# Patient Record
Sex: FEMALE | Race: WHITE | NOT HISPANIC OR LATINO | Employment: OTHER | URBAN - METROPOLITAN AREA
[De-identification: names, ages, dates, MRNs, and addresses within clinical notes are randomized per-mention and may not be internally consistent; named-entity substitution may affect disease eponyms.]

---

## 2023-04-10 ENCOUNTER — HOSPITAL ENCOUNTER (OUTPATIENT)
Dept: DATA CONVERSION | Facility: HOSPITAL | Age: 73
End: 2023-04-10
Attending: ANESTHESIOLOGY
Payer: MEDICARE

## 2023-04-10 DIAGNOSIS — I10 ESSENTIAL (PRIMARY) HYPERTENSION: ICD-10-CM

## 2023-04-10 DIAGNOSIS — M54.16 RADICULOPATHY, LUMBAR REGION: ICD-10-CM

## 2023-04-10 DIAGNOSIS — Z91.041 RADIOGRAPHIC DYE ALLERGY STATUS: ICD-10-CM

## 2023-05-12 ENCOUNTER — HOSPITAL ENCOUNTER (OUTPATIENT)
Dept: DATA CONVERSION | Facility: HOSPITAL | Age: 73
End: 2023-05-12
Attending: ANESTHESIOLOGY
Payer: MEDICARE

## 2023-05-12 DIAGNOSIS — M54.16 RADICULOPATHY, LUMBAR REGION: ICD-10-CM

## 2023-05-12 DIAGNOSIS — E78.5 HYPERLIPIDEMIA, UNSPECIFIED: ICD-10-CM

## 2023-05-12 DIAGNOSIS — Z91.041 RADIOGRAPHIC DYE ALLERGY STATUS: ICD-10-CM

## 2023-05-12 DIAGNOSIS — M48.061 SPINAL STENOSIS, LUMBAR REGION WITHOUT NEUROGENIC CLAUDICATION: ICD-10-CM

## 2023-05-12 DIAGNOSIS — I10 ESSENTIAL (PRIMARY) HYPERTENSION: ICD-10-CM

## 2023-09-27 DIAGNOSIS — I10 HYPERTENSION, UNSPECIFIED TYPE: ICD-10-CM

## 2023-09-27 RX ORDER — TRIAMTERENE/HYDROCHLOROTHIAZID 37.5-25 MG
1 TABLET ORAL DAILY
Qty: 90 TABLET | Refills: 0 | Status: SHIPPED | OUTPATIENT
Start: 2023-09-27 | End: 2024-01-02 | Stop reason: SDUPTHER

## 2023-10-02 NOTE — OP NOTE
Post Operative Note:     Post-Procedure Diagnosis: Right-sided lumbar radiculitis   Procedure: 1.   2.   3.   4.   5.   Surgeon: Jamie   Resident/Fellow/Other Assistant: Fellow   Estimated Blood Loss (mL): none   Specimen: no   Findings: None     Operative Report Dictated:  Dictation: not applicable - note contains Operative  Report   Operative Report:    Preoperative diagnosis:  Lumbar radiculitis  Postoperative diagnosis:  Lumbar radiculitis  Procedure: Right L4 and 5 Lumbar transforaminal epidural steroid injection under fluoroscopic guidance  Surgeon: Gemma Ayala  Assistant:  Fellow  Anesthesia: Local, IV sedation  Complications: Apparently none    Clinical note: Josep is a 72-year-old female with history of low back and right leg pain who presents today for the aforementioned procedure.    Procedure note: The patient was met in the preoperative holding area after risks benefits and alternatives to procedure were discussed with the patient, informed consent was obtained. Patient brought back to the procedure room and placed in the prone  position on the fluoroscopy table. Area over the back was exposed, prepped, draped, in the usual sterile fashion.  Skin and subcutaneous tissues to the neuroforamen was anesthetized using 0.5% lidocaine.  22-gauge Sprotte needles were inserted in the  skin and advanced into the foramen. Needle tip position was confirmed in AP oblique and lateral view.  Contrast was injected which showed appropriate epidural spread, no intravascular or intrathecal uptake. A total of 2 mL of 0.5% lidocaine mixed with  10 mg dexamethasone was injected in divided doses among the 2 needles. Needles removed, bandage applied, patient tolerated the procedure well with no immediate complications.      Attestation:   Note Completion:  Attending Attestation I was present for the entire procedure         Electronic Signatures:  Gemma Ayala)  (Signed 10-Apr-2023 15:00)   Authored: Post  Operative Note, Note Completion      Last Updated: 10-Apr-2023 15:00 by Gemma Ayala)

## 2023-10-02 NOTE — OP NOTE
Post Operative Note:     Post-Procedure Diagnosis: Right-sided lumbar radiculitis   Procedure: 1.   2.   3.   4.   5.   Surgeon: Jamie   Resident/Fellow/Other Assistant: Brendan   Estimated Blood Loss (mL): none   Specimen: no   Findings: None     Operative Report Dictated:  Dictation: not applicable - note contains Operative  Report   Operative Report:    Preoperative diagnosis:  Lumbar radiculitis  Postoperative diagnosis:  Lumbar radiculitis, stenosis  Procedure: Right L4 and 5 lumbar transforaminal epidural steroid injection under fluoroscopic guidance  with methylprednisolone  Surgeon: Gemma Ayala  Assistant:  Fellow  Anesthesia: Local  Complications: Apparently none    Clinical note: Josep is a 72-year-old female with history of low back and right leg pain who presents today for the aforementioned procedure.    Procedure note: The patient was met in the preoperative holding area after risks benefits and alternatives to procedure were discussed with the patient, informed consent was obtained. Patient brought back to the procedure room and placed in the prone  position on the fluoroscopy table. Area over the back was exposed, prepped, draped, in the usual sterile fashion.  Skin and subcutaneous tissues to the neuroforamen was anesthetized using 0.5% lidocaine.  22-gauge Sprotte needles were inserted in the  skin and advanced into the foramen. Needle tip position was confirmed in AP oblique and lateral view.  Contrast was injected which showed appropriate epidural spread, no intravascular or intrathecal uptake. A total of 2 mL of  2% lidocaine was injected in divided dose among 2 needles.  Lower extremity motor strength assessed at 1 and 2 minutes which was grossly  intact.  Thereafter 1 mL of half percent lidocaine mixed with 40 mg of methylprednisolone was injected in divided doses among the 2 needles. Needles removed, bandage applied, patient tolerated  the procedure well with no immediate  complications.      Attestation:   Note Completion:  Attending Attestation I was present for the entire procedure         Electronic Signatures:  Gemma Ayala)  (Signed 12-May-2023 13:15)   Authored: Post Operative Note, Note Completion      Last Updated: 12-May-2023 13:15 by Gemma Ayala)

## 2023-10-18 DIAGNOSIS — I10 ESSENTIAL (PRIMARY) HYPERTENSION: ICD-10-CM

## 2023-10-18 RX ORDER — ATORVASTATIN CALCIUM 10 MG/1
10 TABLET, FILM COATED ORAL DAILY
Qty: 90 TABLET | Refills: 2 | Status: SHIPPED | OUTPATIENT
Start: 2023-10-18 | End: 2024-01-02 | Stop reason: SDUPTHER

## 2023-11-15 RX ORDER — ALPRAZOLAM 0.5 MG/1
TABLET ORAL
COMMUNITY
End: 2024-01-02 | Stop reason: SDUPTHER

## 2023-11-15 RX ORDER — ALPRAZOLAM 0.5 MG/1
TABLET ORAL
Qty: 7 TABLET | OUTPATIENT
Start: 2023-11-15

## 2023-11-19 ENCOUNTER — PATIENT MESSAGE (OUTPATIENT)
Dept: ORTHOPEDIC SURGERY | Facility: CLINIC | Age: 73
End: 2023-11-19
Payer: MEDICARE

## 2023-11-21 NOTE — TELEPHONE ENCOUNTER
From: Josep Chun  To: Nancy Garrett MD  Sent: 11/19/2023 12:08 PM EST  Subject: Trigger finger    Hi dr Garrett - I last met with you on May 5 2023. At that visit I was given an injection to help with my trigger finger. It has retuned - can I make an appointment for another injection Thank you Josep Chun

## 2023-11-29 ENCOUNTER — OFFICE VISIT (OUTPATIENT)
Dept: ORTHOPEDIC SURGERY | Facility: HOSPITAL | Age: 73
End: 2023-11-29
Payer: MEDICARE

## 2023-11-29 DIAGNOSIS — M79.641 PAIN OF RIGHT HAND: ICD-10-CM

## 2023-11-29 DIAGNOSIS — M65.341 TRIGGER RING FINGER OF RIGHT HAND: Primary | ICD-10-CM

## 2023-11-29 PROCEDURE — 20551 NJX 1 TENDON ORIGIN/INSJ: CPT | Performed by: STUDENT IN AN ORGANIZED HEALTH CARE EDUCATION/TRAINING PROGRAM

## 2023-11-29 PROCEDURE — 1126F AMNT PAIN NOTED NONE PRSNT: CPT | Performed by: STUDENT IN AN ORGANIZED HEALTH CARE EDUCATION/TRAINING PROGRAM

## 2023-11-29 PROCEDURE — 76942 ECHO GUIDE FOR BIOPSY: CPT | Performed by: STUDENT IN AN ORGANIZED HEALTH CARE EDUCATION/TRAINING PROGRAM

## 2023-11-29 PROCEDURE — 20551 NJX 1 TENDON ORIGIN/INSJ: CPT | Mod: RT | Performed by: STUDENT IN AN ORGANIZED HEALTH CARE EDUCATION/TRAINING PROGRAM

## 2023-11-29 PROCEDURE — 99213 OFFICE O/P EST LOW 20 MIN: CPT | Performed by: STUDENT IN AN ORGANIZED HEALTH CARE EDUCATION/TRAINING PROGRAM

## 2023-11-29 PROCEDURE — 1159F MED LIST DOCD IN RCRD: CPT | Performed by: STUDENT IN AN ORGANIZED HEALTH CARE EDUCATION/TRAINING PROGRAM

## 2023-11-29 PROCEDURE — 99213 OFFICE O/P EST LOW 20 MIN: CPT | Mod: GC | Performed by: STUDENT IN AN ORGANIZED HEALTH CARE EDUCATION/TRAINING PROGRAM

## 2023-11-29 PROCEDURE — 1036F TOBACCO NON-USER: CPT | Performed by: STUDENT IN AN ORGANIZED HEALTH CARE EDUCATION/TRAINING PROGRAM

## 2023-11-29 RX ORDER — METHYLPREDNISOLONE ACETATE 40 MG/ML
40 INJECTION, SUSPENSION INTRA-ARTICULAR; INTRALESIONAL; INTRAMUSCULAR; SOFT TISSUE ONCE
Status: SHIPPED | OUTPATIENT
Start: 2023-11-29

## 2023-11-29 ASSESSMENT — PAIN DESCRIPTION - DESCRIPTORS: DESCRIPTORS: SHARP

## 2023-11-29 ASSESSMENT — PAIN SCALES - GENERAL: PAINLEVEL_OUTOF10: 8

## 2023-11-29 ASSESSMENT — PAIN - FUNCTIONAL ASSESSMENT: PAIN_FUNCTIONAL_ASSESSMENT: 0-10

## 2023-11-29 NOTE — PROGRESS NOTES
REFERRAL SOURCE: No ref. provider found     CHIEF COMPLAINT: right hand pain    HISTORY OF PRESENT ILLNESS  Josep Chun is a very pleasant 73 y.o. right-hand dominant female with history of HLD, osteoporosis, migraines, chronic neck and low back pain who is here for evaluation of right ring trigger finger.     Previous history:   5/5/2023: Right hand symptoms started gradually a few months ago. She is having increasing difficulty with ADLs such as cooking given difficulty extending the 4th digit. She has not had therapies or an injection to this hand in the past. Left hand symptoms started after her cervical epidural injection. She had an IV in the dorsal radial wrist in the area of the snuffbox during the epidural. Postoperatively she has noted a burning sensation and paresthesias in the dorsal radial hand radiating to the dorsal 1-3 digits. She denies weakness. She does have a history of chronic neck pain with a right radiculopathy and denies neck pain radiating down the left upper extremity.      8/9/23: Complaining now of continued area of numbness/tingling in the dorsal left hand radiating into the dorsal first through third, but mostly the second digit. States that this area has not changed significantly since last visit. Palpation over the dorsal radial forearm, approximately 4 cm proximal to the wrist joint, reproduces symptoms. This is reportedly the area where she had her IV placed previously. She also complains of left middle finger PIP joint achiness and pain that has been bothering her for the past 2 months. No injury or trauma. Right ring finger trigger finger doing well after injection last visit.     She underwent right superficial radial nerve hydrodissection on 8/9/2023.    Interval history:  11/29/23: Presents for complaint of return of right ring finger trigger finger.  She was previously seen for this on 5/5/2023 and received a corticosteroid injection at that time.  States that symptoms  started returning 3 to 4 weeks ago and have been getting progressively worse.  They are not quite back to the point of where they were prior to initial visit for the same complaint, but states that when she wakes up in the morning, she often has to force her finger open.  She would like to try 1 more injection and if it comes back plans on going to see Dr. Patel.  Denies interval injury to the hand.    She did note improvement of the right superficial radial nerve hydrodissection on 2023, though she does have low-level pain that is not bothersome and only happens if she lightly rubs over that area.    MEDS    Current Outpatient Medications:     ALPRAZolam (Xanax) 0.5 mg tablet, Take by mouth., Disp: , Rfl:     atorvastatin (Lipitor) 10 mg tablet, TAKE 1 TABLET BY MOUTH EVERY DAY, Disp: 90 tablet, Rfl: 2    triamterene-hydrochlorothiazid (Maxzide-25) 37.5-25 mg tablet, TAKE 1 TABLET BY MOUTH EVERY DAY, Disp: 90 tablet, Rfl: 0    ALLERGIES  Not on File    PAST MEDICAL HISTORY  Past Medical History:   Diagnosis Date    Meniere's disease, unspecified ear     Meniere disease    Pain in unspecified hip 2013    Joint pain, hip    Personal history of other diseases of the musculoskeletal system and connective tissue 2013    History of bursitis    Personal history of other endocrine, nutritional and metabolic disease     History of thyroid disease    Personal history of other infectious and parasitic diseases     History of varicella       PAST SURGICAL HISTORY  Past Surgical History:   Procedure Laterality Date     SECTION, CLASSIC  2014     Section    COLONOSCOPY  2014    Complete Colonoscopy    OTHER SURGICAL HISTORY  2020    Shoulder arthroscopy    TUBAL LIGATION  2014    Tubal Ligation       SOCIAL HISTORY   Social History     Socioeconomic History    Marital status:      Spouse name: Not on file    Number of children: Not on file    Years of education:  Not on file    Highest education level: Not on file   Occupational History    Not on file   Tobacco Use    Smoking status: Not on file    Smokeless tobacco: Not on file   Substance and Sexual Activity    Alcohol use: Not on file    Drug use: Not on file    Sexual activity: Not on file   Other Topics Concern    Not on file   Social History Narrative    Not on file     Social Determinants of Health     Financial Resource Strain: Not on file   Food Insecurity: Not on file   Transportation Needs: Not on file   Physical Activity: Not on file   Stress: Not on file   Social Connections: Not on file   Intimate Partner Violence: Not on file   Housing Stability: Not on file       FAMILY HISTORY  No family history on file.    REVIEW OF SYSTEMS  Except for those mentioned in the history of present illness, and below, a complete review of systems is negative.     Review of Systems    VITALS  There were no vitals filed for this visit.    PHYSICAL EXAMINATION   GENERAL: Awake, alert, and oriented, no apparent distress, pleasant, and cooperative  PSYC: Mood is euthymic, affect is congruent  EAR, NOSE, THROAT: Normocephalic, atraumatic, moist membranes, anicteric sclera  LUNG: Nonlabored breathing  HEART: No clubbing or cyanosis  SKIN: No increased erythema, warmth, rashes, or concerning skin lesions  NEURO: Sensation is intact in the bilateral upper and lower extremities. Strength is grossly 5 out of 5 throughout the bilateral upper and lower extremities including wrist extension and EIP, unless noted below. Sensation is intact to light touch in bilateral upper extremities.  MUSCULOSKELETAL:  Examination of the right wrist and hand: Wrist range of motion is full and pain free except positive trigger finger of digit 4. No swelling or ecchymosis. No thenar or hypothenar atrophy. No tenderness with palpation of the wrist joint, TFCC region, 1st dorsal compartment, 1st CMC joint or STT joint. No tenderness with palpation of the  scaphoid.     IMAGING STUDIES:  Radiographs of bilateral hands dated 5/5/2023 - scattered PIP and DIP arthritis as well as first CMC arthritic change, all of which is mild.      IMPRESSION  #1  Right ring finger trigger finger, recurrent    PLAN  The following was discussed with the patient:    Josep Chun is a very pleasant 73 y.o. right-hand dominant female with history of HLD, osteoporosis, migraines, chronic neck and low back pain who is here for evaluation of right ring finger trigger finger.  Her history and physical exam seem most consistent with recurrence of right ring finger trigger finger. We discussed the utility of a repeat right ring finger trigger finger injection under ultrasound guidance and she wished to proceed.  Please see procedure note below for complete details.  We did discuss that if this continues to recur that she should seek surgical consultation and she plans to follow-up with Dr. Patel if that does happen.  Follow-up with me as needed.    The patient was counseled to remain active, but avoid activities that worsen symptoms. The patient was in agreement with this plan. All questions were answered to the best of my ability.    PATIENT EDUCATION:  Education was discussed at today's appointment. A learning needs assessment was performed.    Primary learner: Josep Chun  Barriers to learning: None  Preferred language: English  Learning preferences include: Seeing and doing.  Discussed: Diagnosis and treatment plan.  Demonstrated: Understanding of material discussed.  Patient education materials given: None.  Learner response: Learner demonstrated understanding.    This note was dictated using Dragon speech recognition software and was not corrected for spelling or grammatical errors.    ______________________________________________________________________________________________________    Pre-Procedure Diagnosis: right ring finger pain and trigger finger   Post-Procedure  Diagnosis: right ring finger pain and trigger finger     Procedure: US-guided right ring finger flexor tendon sheath corticosteroid injection     History of Present Illness: VALENTINO SAAVEDRA is a pleasant 73 female with finger pain/catching due to trigger finger who is here for the above procedure for diagnostic purposes and improved pain control.      Medications and allergies were reviewed with the patient. No contraindications were identified.     Informed Consent  Following denial of allergy and review of potential side effects and complications including but not necessarily limited to infection, allergic reaction, local tissue breakdown, injury to soft tissue and/or nerves and seizure, the patient indicated understanding and agreed to proceed. Written consent to treatment was obtained and the patient verbalized consent for the procedure.     Procedural Details  The use of direct ultrasound visualization of the needle (rather than a non-guided injection) was required to increase patient safety by excluding inadvertent intramuscular or intratendinous placement and minimizing bleeding by avoiding osteochondral or vascular injury from the needle. Additionally, the increased accuracy of placement may increase clinical effectiveness and will allow higher diagnostic specificity when evaluating effectiveness of this injection.     Using ultrasound, a pre-scan of the region was performed to identify the target structure.      The area was prepped with chlorhexidine, then re-examined using the same transducer, a sterile ultrasound transducer cover, and sterile ultrasound gel.     Procedural pause conducted to verify: Correct patient identity, procedure to be performed, and as applicable, correct side and site, correct patient position, and availability of implants, special equipment, or special requirements.     Procedure:   Transducer: Linear array transducer.  Patient position: Supine with the forearm fully supinated,  wrist in a neutral position, and the fingers fully extended  Localization process: With the transducer in an anatomic sagittal plane, the flexor tendon was localized in a long axis view.  Local anesthesia: No local anesthesia was used.  Needle: A 30-gauge, 1-inch needle was used for the injection.  Approach: A distal to proximal, in-plane, approach was used to guide the needle tip into the superficial flexor tendon sheath in the region of the A1 pulley.  Injection/Aspiration: A mixture of 0.25 cc of 1% lidocaine and 1 cc of DepoMedrol (40mg/mL) was injected into the right flexor tendon sheath in the region of the A1 pulley without complication.      Post-procedural care: The patient tolerated the procedure well and reported complete pain relief during the anesthetic phase. The patient was asked to ice for improved pain control and avoid submerging the area in water for the next 48 hours to help reduce the risk of infection. The patient was instructed to call the office immediately if there are any questions or concerns.      PATIENT EDUCATION:  Education of the diagnosis and treatment plan was discussed at today's appointment. A learning needs assessment was performed. The patient demonstrated understanding.     _________________________________________________________________________________________    Patient seen and examined with sports medicine fellow, Dr. Rhodes. History, physical examination, pertinent imaging findings and the plan of care were discussed and I performed the key portions of the history, physical examination, and discussion of the plan of care. I have edited his note and agree with the findings. Dr. Rhodes performed the procedure under my direct supervision. I was present for the entire procedure.     Nancy Garrett MD    [facility]

## 2024-01-02 ENCOUNTER — OFFICE VISIT (OUTPATIENT)
Dept: PRIMARY CARE | Facility: CLINIC | Age: 74
End: 2024-01-02
Payer: MEDICARE

## 2024-01-02 VITALS
OXYGEN SATURATION: 98 % | DIASTOLIC BLOOD PRESSURE: 60 MMHG | HEIGHT: 65 IN | HEART RATE: 85 BPM | BODY MASS INDEX: 33.46 KG/M2 | SYSTOLIC BLOOD PRESSURE: 123 MMHG

## 2024-01-02 DIAGNOSIS — E78.5 HYPERLIPIDEMIA, UNSPECIFIED HYPERLIPIDEMIA TYPE: ICD-10-CM

## 2024-01-02 DIAGNOSIS — G89.29 CHRONIC NONINTRACTABLE HEADACHE, UNSPECIFIED HEADACHE TYPE: ICD-10-CM

## 2024-01-02 DIAGNOSIS — R51.9 CHRONIC NONINTRACTABLE HEADACHE, UNSPECIFIED HEADACHE TYPE: ICD-10-CM

## 2024-01-02 DIAGNOSIS — I10 HYPERTENSION, UNSPECIFIED TYPE: ICD-10-CM

## 2024-01-02 DIAGNOSIS — F41.9 ANXIETY: ICD-10-CM

## 2024-01-02 DIAGNOSIS — Z00.00 MEDICARE ANNUAL WELLNESS VISIT, SUBSEQUENT: Primary | ICD-10-CM

## 2024-01-02 PROBLEM — E78.01 FAMILIAL HYPERCHOLESTEROLEMIA: Status: ACTIVE | Noted: 2024-01-02

## 2024-01-02 PROBLEM — G47.00 INSOMNIA: Status: RESOLVED | Noted: 2024-01-02 | Resolved: 2024-01-02

## 2024-01-02 PROBLEM — M16.9 HIP OSTEOARTHRITIS: Status: RESOLVED | Noted: 2024-01-02 | Resolved: 2024-01-02

## 2024-01-02 PROBLEM — M19.042 PRIMARY OSTEOARTHRITIS OF LEFT HAND: Status: RESOLVED | Noted: 2024-01-02 | Resolved: 2024-01-02

## 2024-01-02 PROBLEM — F32.A DEPRESSION: Status: RESOLVED | Noted: 2024-01-02 | Resolved: 2024-01-02

## 2024-01-02 PROBLEM — E03.9 HYPOTHYROIDISM: Status: RESOLVED | Noted: 2024-01-02 | Resolved: 2024-01-02

## 2024-01-02 PROBLEM — R92.30 BREAST DENSITY: Status: RESOLVED | Noted: 2024-01-02 | Resolved: 2024-01-02

## 2024-01-02 PROCEDURE — 3078F DIAST BP <80 MM HG: CPT | Performed by: STUDENT IN AN ORGANIZED HEALTH CARE EDUCATION/TRAINING PROGRAM

## 2024-01-02 PROCEDURE — 1170F FXNL STATUS ASSESSED: CPT | Performed by: STUDENT IN AN ORGANIZED HEALTH CARE EDUCATION/TRAINING PROGRAM

## 2024-01-02 PROCEDURE — G0439 PPPS, SUBSEQ VISIT: HCPCS | Performed by: STUDENT IN AN ORGANIZED HEALTH CARE EDUCATION/TRAINING PROGRAM

## 2024-01-02 PROCEDURE — 3074F SYST BP LT 130 MM HG: CPT | Performed by: STUDENT IN AN ORGANIZED HEALTH CARE EDUCATION/TRAINING PROGRAM

## 2024-01-02 PROCEDURE — 99214 OFFICE O/P EST MOD 30 MIN: CPT | Performed by: STUDENT IN AN ORGANIZED HEALTH CARE EDUCATION/TRAINING PROGRAM

## 2024-01-02 PROCEDURE — 1160F RVW MEDS BY RX/DR IN RCRD: CPT | Performed by: STUDENT IN AN ORGANIZED HEALTH CARE EDUCATION/TRAINING PROGRAM

## 2024-01-02 PROCEDURE — 1036F TOBACCO NON-USER: CPT | Performed by: STUDENT IN AN ORGANIZED HEALTH CARE EDUCATION/TRAINING PROGRAM

## 2024-01-02 PROCEDURE — 1126F AMNT PAIN NOTED NONE PRSNT: CPT | Performed by: STUDENT IN AN ORGANIZED HEALTH CARE EDUCATION/TRAINING PROGRAM

## 2024-01-02 PROCEDURE — 1159F MED LIST DOCD IN RCRD: CPT | Performed by: STUDENT IN AN ORGANIZED HEALTH CARE EDUCATION/TRAINING PROGRAM

## 2024-01-02 RX ORDER — ALPRAZOLAM 0.5 MG/1
0.5 TABLET ORAL NIGHTLY PRN
Qty: 10 TABLET | Refills: 0 | Status: SHIPPED | OUTPATIENT
Start: 2024-01-02 | End: 2024-04-16 | Stop reason: WASHOUT

## 2024-01-02 RX ORDER — AMITRIPTYLINE HYDROCHLORIDE 10 MG/1
10 TABLET, FILM COATED ORAL NIGHTLY
COMMUNITY
End: 2024-01-02 | Stop reason: SDUPTHER

## 2024-01-02 RX ORDER — TRIAMTERENE/HYDROCHLOROTHIAZID 37.5-25 MG
1 TABLET ORAL DAILY
Qty: 90 TABLET | Refills: 0 | Status: SHIPPED | OUTPATIENT
Start: 2024-01-02 | End: 2024-05-28

## 2024-01-02 RX ORDER — ATORVASTATIN CALCIUM 10 MG/1
10 TABLET, FILM COATED ORAL DAILY
Qty: 90 TABLET | Refills: 2 | Status: SHIPPED | OUTPATIENT
Start: 2024-01-02

## 2024-01-02 RX ORDER — AMITRIPTYLINE HYDROCHLORIDE 10 MG/1
10 TABLET, FILM COATED ORAL NIGHTLY
Qty: 90 TABLET | Refills: 3 | Status: SHIPPED | OUTPATIENT
Start: 2024-01-02 | End: 2025-01-01

## 2024-01-02 ASSESSMENT — ACTIVITIES OF DAILY LIVING (ADL)
BATHING: INDEPENDENT
DRESSING: INDEPENDENT
GROCERY_SHOPPING: INDEPENDENT
DOING_HOUSEWORK: INDEPENDENT
MANAGING_FINANCES: INDEPENDENT
TAKING_MEDICATION: INDEPENDENT

## 2024-01-02 ASSESSMENT — PATIENT HEALTH QUESTIONNAIRE - PHQ9
2. FEELING DOWN, DEPRESSED OR HOPELESS: NOT AT ALL
1. LITTLE INTEREST OR PLEASURE IN DOING THINGS: NOT AT ALL
SUM OF ALL RESPONSES TO PHQ9 QUESTIONS 1 AND 2: 0

## 2024-01-02 NOTE — PATIENT INSTRUCTIONS
Please stop at the lab ([address]) to complete your blood and/or urine work that I've ordered for you.    I will contact you with the results at my soonest convenience. I strongly urge you to use edelight as this is the quickest and easiest way to access your results and receive my correspondences.    I have renewed your chronic medications today.     Follow up with your specialists as previously scheduled.     See me yearly for a Medicare Wellness Visit, and sooner as needed for sick visits

## 2024-01-02 NOTE — PROGRESS NOTES
"Subjective   Reason for Visit: Josep Chun is an 73 y.o. female here for a Medicare Wellness visit.     Past Medical, Surgical, and Family History reviewed and updated in chart.    Reviewed all medications by prescribing practitioner or clinical pharmacist (such as prescriptions, OTCs, herbal therapies and supplements) and documented in the medical record.    HPI    Re: back/breast pain - markedly improved since breast reduction.  Patient getting an opinion about a revision as she doesn't like the way it looks laterally.     Re: back pain - see pain mgmt notes. Gets injections on occasion.     Re: CV - has hx of HLD controlled on statin, and HTN controlled on current meds. Reports no sx high or low from HTN; denies blurry vision, HA, dizziness LoC CP SoB Good and leg swelling     Re: Anxiety - doing well. Uses 10 Xanax a year, requests a refill on this today.    Re: Hm - shots current. Mamm and CRS current.     .PMHx, FHx, Social Hx, Surg Hx personally reviewed at this appointment. No pertinent findings and/or changes from prior (if applicable).    ROS: Denies wt gain/loss f/c HA LoC CP SOB NVDC. See HPI above, and scanned sheet (if applicable). All other systems are reviewed and are without complaint.     Patient Care Team:  Tommy Schneider MD as PCP - General (Internal Medicine)     Review of Systems    Objective     Vitals:  /60   Pulse 85   Ht 1.638 m (5' 4.5\")   SpO2 98%   BMI 33.46 kg/m²       Physical Exam  Gen: well developed in NAD. AAO x3.  HEENT: NC/AT. Anicteric sclera, symmetric pupils. MMM no thrush.  Neck: Soft, supple. No LAD. No goiter.   CV: RRR nl s1s2 no m/r/g  Pulm: CTAB no w/r/r, good air exchange  GI: soft NTND BS+ no hsm  Ext: WWP no edema  Neuro: II-XII grossly intact, nonfocal systemic findings  MSK: 5/5 strength b/l UE and LE  Gait: unremarkable    Lab Results   Component Value Date    WBC 6.6 10/05/2021    HGB 14.4 10/05/2021    HCT 42.4 10/05/2021     10/05/2021 "    CHOL 178 04/05/2021    TRIG 169 (H) 04/05/2021    HDL 66.7 04/05/2021    ALT 14 10/05/2021    AST 16 10/05/2021     10/05/2021    K 3.9 10/05/2021    CL 98 10/05/2021    CREATININE 0.86 10/05/2021    BUN 16 10/05/2021    CO2 30 10/05/2021    TSH 1.72 04/05/2021     par     XR hand 3+ views bilateral  Narrative: Interpreted By:  RUTH BRIAN MD  MRN: [id number]  Patient Name: VALENTINO SAAVEDRA     STUDY:  BILATERAL HAND; MIN 3 VIEWS;  5/5/2023 9:28 am     INDICATION:  bilateral hand pain  M19.042: Primary osteoarthritis of left hand  M19.041: Arthritis of hand, right, degenerative.     COMPARISON:  None.     ACCESSION NUMBER(S):  [id number]     ORDERING CLINICIAN:  ZELALEM COURTNEY     FINDINGS:  Three views each of the bilateral hands.     Left:     No acute fracture. No dislocation. The soft tissues are unremarkable.  No pronounced degenerative change.     Right:     No acute fracture. No dislocation. There 1st distal interphalangeal  degenerative changes. Mild 1st carpometacarpal degenerative change.  Mild 2nd and 3rd distal interphalangeal degenerative change. The soft  tissues are unremarkable.     Impression: Mild polyarticular right hand osteoarthrosis.     Relatively unremarkable radiographs left hand.         Assessment/Plan   # HTN: at/near goal  - continue current regimen  - routine lab work if not recent  - continue lifestyle modifications    # hyperlipidemia: stable  - lipid panel, ASCVD+ score based on these values if age appropriate  - continue statin     # Anxiety: stable  - reasonable to renew 10 Xanax yearly  - OARRs run    # s/p breast surg  - follow up breast team    # Health Maintenance  - routine blood work  - Colon Cancer Screening: current  - Mammogram: current  - DEXA: on Reclast  - Immunizations: current    Problem List Items Addressed This Visit       Essential (primary) hypertension    Overview     Last Assessment & Plan: Formatting of this note might be different from the  original. Assessment: /78 in [facility] office, on Maxzide, follows with PCP Last 3 Encounter BP Readings:    Date:        BP:    06/01/2023   132/78    03/10/2023   149/57    11/23/2022   124/75         Relevant Medications    triamterene-hydrochlorothiazid (Maxzide-25) 37.5-25 mg tablet     Other Visit Diagnoses       Medicare annual wellness visit, subsequent    -  Primary    Hyperlipidemia, unspecified hyperlipidemia type        Relevant Medications    atorvastatin (Lipitor) 10 mg tablet    Other Relevant Orders    CBC and Auto Differential    Comprehensive Metabolic Panel    Lipid Panel    Anxiety        Relevant Medications    ALPRAZolam (Xanax) 0.5 mg tablet    Chronic nonintractable headache, unspecified headache type        Relevant Medications    amitriptyline (Elavil) 10 mg tablet

## 2024-04-15 ASSESSMENT — DERMATOLOGY QUALITY OF LIFE (QOL) ASSESSMENT
RATE HOW BOTHERED YOU ARE BY SYMPTOMS OF YOUR SKIN PROBLEM (EG, ITCHING, STINGING BURNING, HURTING OR SKIN IRRITATION): 5
RATE HOW BOTHERED YOU ARE BY SYMPTOMS OF YOUR SKIN PROBLEM (EG, ITCHING, STINGING BURNING, HURTING OR SKIN IRRITATION): 5
WHAT SINGLE SKIN CONDITION LISTED BELOW IS THE PATIENT ANSWERING THE QUALITY-OF-LIFE ASSESSMENT QUESTIONS ABOUT: ACNE
RATE HOW BOTHERED YOU ARE BY EFFECTS OF YOUR SKIN PROBLEMS ON YOUR ACTIVITIES (EG, GOING OUT, ACCOMPLISHING WHAT YOU WANT, WORK ACTIVITIES OR YOUR RELATIONSHIPS WITH OTHERS): 0 - NEVER BOTHERED
RATE HOW BOTHERED YOU ARE BY EFFECTS OF YOUR SKIN PROBLEMS ON YOUR ACTIVITIES (EG, GOING OUT, ACCOMPLISHING WHAT YOU WANT, WORK ACTIVITIES OR YOUR RELATIONSHIPS WITH OTHERS): 0 - NEVER BOTHERED
RATE HOW EMOTIONALLY BOTHERED YOU ARE BY YOUR SKIN PROBLEM (FOR EXAMPLE, WORRY, EMBARRASSMENT, FRUSTRATION): 0 - NEVER BOTHERED
RATE HOW EMOTIONALLY BOTHERED YOU ARE BY YOUR SKIN PROBLEM (FOR EXAMPLE, WORRY, EMBARRASSMENT, FRUSTRATION): 0 - NEVER BOTHERED
WHAT SINGLE SKIN CONDITION LISTED BELOW IS THE PATIENT ANSWERING THE QUALITY-OF-LIFE ASSESSMENT QUESTIONS ABOUT: ACNE

## 2024-04-16 ENCOUNTER — OFFICE VISIT (OUTPATIENT)
Dept: DERMATOLOGY | Facility: CLINIC | Age: 74
End: 2024-04-16
Payer: MEDICARE

## 2024-04-16 DIAGNOSIS — L57.8 ACTINIC SKIN DAMAGE: ICD-10-CM

## 2024-04-16 DIAGNOSIS — L82.1 SEBORRHEIC KERATOSIS: ICD-10-CM

## 2024-04-16 DIAGNOSIS — D22.9 MULTIPLE BENIGN NEVI: Primary | ICD-10-CM

## 2024-04-16 DIAGNOSIS — L30.4 INTERTRIGO: ICD-10-CM

## 2024-04-16 DIAGNOSIS — L81.4 LENTIGO: ICD-10-CM

## 2024-04-16 DIAGNOSIS — L21.9 SEBORRHEIC DERMATITIS: ICD-10-CM

## 2024-04-16 DIAGNOSIS — Z12.83 SCREENING EXAM FOR SKIN CANCER: ICD-10-CM

## 2024-04-16 PROCEDURE — 1160F RVW MEDS BY RX/DR IN RCRD: CPT | Performed by: DERMATOLOGY

## 2024-04-16 PROCEDURE — 99204 OFFICE O/P NEW MOD 45 MIN: CPT | Performed by: DERMATOLOGY

## 2024-04-16 PROCEDURE — 1036F TOBACCO NON-USER: CPT | Performed by: DERMATOLOGY

## 2024-04-16 PROCEDURE — 1159F MED LIST DOCD IN RCRD: CPT | Performed by: DERMATOLOGY

## 2024-04-16 RX ORDER — KETOCONAZOLE 20 MG/G
CREAM TOPICAL 2 TIMES DAILY
Qty: 60 G | Refills: 11 | Status: SHIPPED | OUTPATIENT
Start: 2024-04-16

## 2024-04-16 ASSESSMENT — DERMATOLOGY PATIENT ASSESSMENT
ARE YOU ON BIRTH CONTROL: NO
DO YOU USE A TANNING BED: NO
DO YOU HAVE IRREGULAR MENSTRUAL CYCLES: NO
ARE YOU AN ORGAN TRANSPLANT RECIPIENT: NO
ARE YOU TRYING TO GET PREGNANT: NO
HAVE YOU HAD OR DO YOU HAVE VASCULAR DISEASE: NO
DO YOU USE SUNSCREEN: OCCASIONALLY
DO YOU HAVE ANY NEW OR CHANGING LESIONS: NO
HAVE YOU HAD OR DO YOU HAVE A STAPH INFECTION: NO

## 2024-04-16 ASSESSMENT — DERMATOLOGY QUALITY OF LIFE (QOL) ASSESSMENT
RATE HOW BOTHERED YOU ARE BY SYMPTOMS OF YOUR SKIN PROBLEM (EG, ITCHING, STINGING BURNING, HURTING OR SKIN IRRITATION): 0 - NEVER BOTHERED
ARE THERE EXCLUSIONS OR EXCEPTIONS FOR THE QUALITY OF LIFE ASSESSMENT: NO
RATE HOW BOTHERED YOU ARE BY EFFECTS OF YOUR SKIN PROBLEMS ON YOUR ACTIVITIES (EG, GOING OUT, ACCOMPLISHING WHAT YOU WANT, WORK ACTIVITIES OR YOUR RELATIONSHIPS WITH OTHERS): 0 - NEVER BOTHERED
RATE HOW EMOTIONALLY BOTHERED YOU ARE BY YOUR SKIN PROBLEM (FOR EXAMPLE, WORRY, EMBARRASSMENT, FRUSTRATION): 0 - NEVER BOTHERED
DATE THE QUALITY-OF-LIFE ASSESSMENT WAS COMPLETED: 66946

## 2024-04-16 ASSESSMENT — PATIENT GLOBAL ASSESSMENT (PGA): PATIENT GLOBAL ASSESSMENT: PATIENT GLOBAL ASSESSMENT:  1 - CLEAR

## 2024-04-16 ASSESSMENT — ITCH NUMERIC RATING SCALE: HOW SEVERE IS YOUR ITCHING?: 0

## 2024-04-16 NOTE — PATIENT INSTRUCTIONS
Inter dry fabric to help with intertrigo in skin folds    Head and Shoulders Royal Oils dandruff shampoo

## 2024-04-16 NOTE — PROGRESS NOTES
Subjective     Josep Chun is a 73 y.o. female who presents for the following: Skin Check (Back, panus).     Prior derm care with Dr. Yeimi Mars (4/2019 - 3/2021) and Dr. Otilia Tinoco (10/2011 - 5/31/12)    Review of Systems:  No other skin or systemic complaints other than what is documented elsewhere in the note.    The following portions of the chart were reviewed this encounter and updated as appropriate:         Skin Cancer History  No skin cancer on file.      Specialty Problems    None       Objective   Well appearing patient in no apparent distress; mood and affect are within normal limits.    A full examination was performed including scalp, head, eyes, ears, nose, lips, neck, chest, axillae, abdomen, back, buttocks, bilateral upper extremities, bilateral lower extremities, hands, feet, fingers, toes, fingernails, and toenails. All findings within normal limits unless otherwise noted below.    Assessment/Plan   1. Multiple benign nevi  Brown and tan macules and papules with reassuring findings on dermoscopy    -These lesions have benign, reassuring patterns on dermoscopy  -Recommend continued self observation, and to contact the office if any changes in nevi are noticed    2. Lentigo  Tan macules    -Benign appearing on exam  -Reassurance, recommend observation    3. Seborrheic keratosis  Stuck on, waxy macule(s)/papule(s)/plaque(s) with comedo-like openings and milia like cysts    -Discussed the nature of the diagnosis  -Reassurance, recommend continued observation    - 2 test spots on sternum and right dorsalhand    4. Actinic skin damage  Background of photodamage with hyper- and hypo-pigmented macules on the skin    5. Screening exam for skin cancer    Full body skin exam  -No lesions concerning for malignancy on the remainder the skin exam today   - The ugly duckling sign was discussed. Monitor for any skin lesions that are different in color, shape, or size than others on body  -Sun  protection was discussed. Recommend SPF 30+, hats with brims, sun protective clothing, and avoiding sun exposure between 10 AM and 2 PM whenever possible  -Recommend regular skin exams or sooner if new or changing lesions     - questions about revision for breast surery reduction - she has follow up with plastic surgeon      Related Procedures  Follow Up In Dermatology - Established Patient    6. Intertrigo  Left Inguinal Area, Right Inguinal Area  Light pink erythema with healing fissuring    -Discussed nature of condition  -This is chronic condition and likely to recur  -Discussed condition is worsened by skin-to-skin contact and moisture. Recommend to keep the area as dry as possible. This may be accomplished by the use of cold air with a hair dryer on cold shot setting to blow cool air on the area after shower to ensure the skin is fully dry prior to cream application.  -Consider the use of InterDry fabric to absorb moisture where areas of skin contact skin.  -Recommend rx Hydrocortisone 2.5% cream to the affected area twice daily until the condition is improved and healing, then discontinue  -Recommend rx Ketoconazole 2% cream to the affected area twice daily until the condition is improved, and then apply Ketoconazole 2% cream nightly at bedtime to fully dry skin for maintenance to prevent recurrence.  -Use of zinc oxide paste to fully dry skin may also be helpful to prevent recurrence as this provides a barrier between areas where skin contacts skin. May use Desitin, purple or blue tube.  -Discussed with/information given to the patient on the risks, benefits and alternatives of the usage of topical corticosteroids, including but not limited to: atrophy (thinning of the skin), striae (stretch marks), telangiectasia (blood vessel growth), and dyspigmentation (discoloration of the skin).  -Recommend to limit long-term use of topical corticosteroids to less than 14 days per month to reduce risk of side  effects.      Related Medications  ketoconazole (NIZOral) 2 % cream  Apply topically 2 times a day.    7. Seborrheic dermatitis  Scalp  Minimal non-adherent scale throughout hair,     Washing scalp once per week  Hair is very dry  Try otc head and shoulders royal oils first before rx         Follow up 1 year Full Skin Exam

## 2024-04-20 ENCOUNTER — LAB REQUISITION (OUTPATIENT)
Dept: LAB | Facility: HOSPITAL | Age: 74
End: 2024-04-20
Payer: MEDICARE

## 2024-04-20 DIAGNOSIS — R30.0 DYSURIA: ICD-10-CM

## 2024-04-20 DIAGNOSIS — N39.0 URINARY TRACT INFECTION, SITE NOT SPECIFIED: ICD-10-CM

## 2024-04-20 PROCEDURE — 87186 SC STD MICRODIL/AGAR DIL: CPT

## 2024-04-20 PROCEDURE — 87086 URINE CULTURE/COLONY COUNT: CPT

## 2024-04-23 LAB — BACTERIA UR CULT: ABNORMAL

## 2024-05-01 DIAGNOSIS — J30.2 SEASONAL ALLERGIES: Primary | ICD-10-CM

## 2024-05-01 RX ORDER — IPRATROPIUM BROMIDE 42 UG/1
2 SPRAY, METERED NASAL 4 TIMES DAILY
Qty: 15 ML | Refills: 11 | Status: SHIPPED | OUTPATIENT
Start: 2024-05-01 | End: 2024-05-28

## 2024-05-27 DIAGNOSIS — I10 HYPERTENSION, UNSPECIFIED TYPE: ICD-10-CM

## 2024-05-28 DIAGNOSIS — J30.2 SEASONAL ALLERGIES: ICD-10-CM

## 2024-05-28 RX ORDER — IPRATROPIUM BROMIDE 42 UG/1
SPRAY, METERED NASAL
Qty: 15 ML | Refills: 4 | Status: SHIPPED | OUTPATIENT
Start: 2024-05-28

## 2024-05-28 RX ORDER — TRIAMTERENE/HYDROCHLOROTHIAZID 37.5-25 MG
1 TABLET ORAL DAILY
Qty: 90 TABLET | Refills: 0 | Status: SHIPPED | OUTPATIENT
Start: 2024-05-28

## 2024-08-29 DIAGNOSIS — I10 HYPERTENSION, UNSPECIFIED TYPE: ICD-10-CM

## 2024-08-29 RX ORDER — TRIAMTERENE/HYDROCHLOROTHIAZID 37.5-25 MG
1 TABLET ORAL DAILY
Qty: 90 TABLET | Refills: 0 | Status: SHIPPED | OUTPATIENT
Start: 2024-08-29

## 2024-10-09 DIAGNOSIS — E78.5 HYPERLIPIDEMIA, UNSPECIFIED HYPERLIPIDEMIA TYPE: ICD-10-CM

## 2024-10-09 RX ORDER — ATORVASTATIN CALCIUM 10 MG/1
10 TABLET, FILM COATED ORAL DAILY
Qty: 90 TABLET | Refills: 2 | Status: SHIPPED | OUTPATIENT
Start: 2024-10-09

## 2024-11-26 ENCOUNTER — OFFICE VISIT (OUTPATIENT)
Dept: URGENT CARE | Age: 74
End: 2024-11-26
Payer: MEDICARE

## 2024-11-26 VITALS
RESPIRATION RATE: 16 BRPM | HEART RATE: 69 BPM | DIASTOLIC BLOOD PRESSURE: 93 MMHG | TEMPERATURE: 97.9 F | OXYGEN SATURATION: 99 % | SYSTOLIC BLOOD PRESSURE: 156 MMHG

## 2024-11-26 DIAGNOSIS — R10.32 LEFT LOWER QUADRANT ABDOMINAL PAIN: ICD-10-CM

## 2024-11-26 DIAGNOSIS — R30.0 DYSURIA: ICD-10-CM

## 2024-11-26 DIAGNOSIS — N94.89 VAGINAL CRAMPING: Primary | ICD-10-CM

## 2024-11-26 LAB
POC APPEARANCE, URINE: CLEAR
POC BILIRUBIN, URINE: NEGATIVE
POC BLOOD, URINE: ABNORMAL
POC COLOR, URINE: ABNORMAL
POC GLUCOSE, URINE: NEGATIVE MG/DL
POC KETONES, URINE: NEGATIVE MG/DL
POC LEUKOCYTES, URINE: NEGATIVE
POC NITRITE,URINE: NEGATIVE
POC PH, URINE: 6 PH
POC PROTEIN, URINE: NEGATIVE MG/DL
POC SPECIFIC GRAVITY, URINE: 1.02
POC UROBILINOGEN, URINE: 4 EU/DL

## 2024-11-26 ASSESSMENT — ENCOUNTER SYMPTOMS
CONSTITUTIONAL NEGATIVE: 1
APPETITE CHANGE: 0
NAUSEA: 0
VOMITING: 0
DYSURIA: 1
ABDOMINAL PAIN: 1

## 2024-11-26 NOTE — PROGRESS NOTES
Subjective   Patient ID: Josep Chun is a 74 y.o. female. They present today with a chief complaint of Urinary Problem (Vaginal) and Vaginal Pain (PT said for the last month when she urinates she has cramping believes she getting a UTI. ).    History of Present Illness  Patient complains of a crampy pain in her bladder intermittently when she is voiding.  In the last week or so the pain has become more constant, only when voiding.  She denies any other symptoms.  She currently has no pain.  She points to the lower left quadrant as the area that becomes painful intermittently      History provided by:  Patient   used: No    Vaginal Pain  Associated symptoms: abdominal pain    Associated symptoms: no nausea and no vomiting        Past Medical History  Allergies as of 2024 - Reviewed 2024   Allergen Reaction Noted    Crab Anaphylaxis 2023    Shellfish derived Anaphylaxis 2024    Iodine Unknown 2004    Amlodipine Swelling 2024    Iodinated contrast media Unknown 2024       (Not in a hospital admission)       Past Medical History:   Diagnosis Date    Breast density 2024    Depression 2024    Hip osteoarthritis 2024    Hypothyroidism 2024    Insomnia 2024    Meniere's disease, unspecified ear     Meniere disease    Pain in unspecified hip 2013    Joint pain, hip    Personal history of other diseases of the musculoskeletal system and connective tissue 2013    History of bursitis    Personal history of other endocrine, nutritional and metabolic disease     History of thyroid disease    Personal history of other infectious and parasitic diseases     History of varicella    Primary osteoarthritis of left hand 2024       Past Surgical History:   Procedure Laterality Date     SECTION, CLASSIC  2014     Section    COLONOSCOPY  2014    Complete Colonoscopy    OTHER SURGICAL HISTORY   01/19/2020    Shoulder arthroscopy    TUBAL LIGATION  03/19/2014    Tubal Ligation        reports that she quit smoking about 45 years ago. Her smoking use included cigarettes. She started smoking about 55 years ago. She has a 15 pack-year smoking history. She has never used smokeless tobacco. She reports that she does not drink alcohol and does not use drugs.    Review of Systems  Review of Systems   Constitutional: Negative.  Negative for appetite change.   Gastrointestinal:  Positive for abdominal pain. Negative for nausea and vomiting.   Genitourinary:  Positive for dysuria.        Complains of bladder discomfort/pain                                  Objective    Vitals:    11/26/24 1015   BP: (!) 156/93   BP Location: Left arm   Patient Position: Sitting   Pulse: 69   Resp: 16   Temp: 36.6 °C (97.9 °F)   SpO2: 99%     No LMP recorded (lmp unknown). Patient is postmenopausal.    Physical Exam  Vitals and nursing note reviewed.   Constitutional:       Appearance: Normal appearance.      Comments: Pleasant talkative cooperative 74-year-old female in no acute distress   HENT:      Head: Normocephalic and atraumatic.      Right Ear: External ear normal.      Left Ear: External ear normal.      Mouth/Throat:      Mouth: Mucous membranes are moist.   Cardiovascular:      Rate and Rhythm: Normal rate.   Pulmonary:      Effort: Pulmonary effort is normal. No respiratory distress.   Abdominal:      General: There is no distension.      Palpations: Abdomen is soft.      Tenderness: There is no abdominal tenderness. There is no right CVA tenderness, left CVA tenderness or guarding.      Comments: Abdomen is soft rounded nontender no masses.   Skin:     General: Skin is warm and dry.   Neurological:      General: No focal deficit present.      Mental Status: She is alert and oriented to person, place, and time.   Psychiatric:         Mood and Affect: Mood normal.         Behavior: Behavior normal.          Procedures    Point of Care Test & Imaging Results from this visit  Results for orders placed or performed in visit on 11/26/24   POCT UA Automated manually resulted   Result Value Ref Range    POC Color, Urine Arabella (A) Straw, Yellow, Light-Yellow    POC Appearance, Urine Clear Clear    POC Glucose, Urine NEGATIVE NEGATIVE mg/dl    POC Bilirubin, Urine NEGATIVE NEGATIVE    POC Ketones, Urine NEGATIVE NEGATIVE mg/dl    POC Specific Gravity, Urine 1.020 1.005 - 1.035    POC Blood, Urine SMALL (1+) (A) NEGATIVE    POC PH, Urine 6.0 No Reference Range Established PH    POC Protein, Urine NEGATIVE NEGATIVE, 30 (1+) mg/dl    POC Urobilinogen, Urine 4.0 (A) 0.2, 1.0 EU/DL    Poc Nitrite, Urine NEGATIVE NEGATIVE    POC Leukocytes, Urine NEGATIVE NEGATIVE      No results found.    Diagnostic study results (if any) were reviewed by Kenia Gaxiola PA-C.    Assessment/Plan   Allergies, medications, history, and pertinent labs/EKGs/Imaging reviewed by Kenia Gaxiola PA-C.     Medical Decision Making  Urinalysis was obtained.  Negative for leukocytes she does have 1+ blood.  She is currently asymptomatic.  Differential diagnosis includes dysuria, sigmoid diverticulitis, abdominal pain of uncertain etiology.  Patient is currently asymptomatic.  She has had no fever chills loss of appetite or any other symptoms.  She is been advised to follow-up with her PCP and consider a CAT scan.  I do not think this has to be done on an emergent basis at this time.  If she feels worse, develops fever chills anorexia or severe pain she agrees to go to the emergency department    Orders and Diagnoses  Diagnoses and all orders for this visit:  Vaginal cramping  -     POCT UA Automated manually resulted      Medical Admin Record      Patient disposition: Home    Electronically signed by Kenia Gaxiola PA-C  10:32 AM

## 2024-11-29 DIAGNOSIS — I10 HYPERTENSION, UNSPECIFIED TYPE: ICD-10-CM

## 2024-12-02 ENCOUNTER — PATIENT MESSAGE (OUTPATIENT)
Dept: PRIMARY CARE | Facility: CLINIC | Age: 74
End: 2024-12-02
Payer: MEDICARE

## 2024-12-02 DIAGNOSIS — I10 ESSENTIAL (PRIMARY) HYPERTENSION: ICD-10-CM

## 2024-12-02 DIAGNOSIS — E78.01 FAMILIAL HYPERCHOLESTEROLEMIA: Primary | ICD-10-CM

## 2024-12-02 RX ORDER — TRIAMTERENE/HYDROCHLOROTHIAZID 37.5-25 MG
1 TABLET ORAL DAILY
Qty: 90 TABLET | Refills: 3 | Status: SHIPPED | OUTPATIENT
Start: 2024-12-02

## 2024-12-18 DIAGNOSIS — R51.9 CHRONIC NONINTRACTABLE HEADACHE, UNSPECIFIED HEADACHE TYPE: ICD-10-CM

## 2024-12-18 DIAGNOSIS — G89.29 CHRONIC NONINTRACTABLE HEADACHE, UNSPECIFIED HEADACHE TYPE: ICD-10-CM

## 2024-12-18 RX ORDER — AMITRIPTYLINE HYDROCHLORIDE 10 MG/1
10 TABLET, FILM COATED ORAL NIGHTLY
Qty: 90 TABLET | Refills: 3 | Status: SHIPPED | OUTPATIENT
Start: 2024-12-18 | End: 2025-12-18

## 2024-12-27 ENCOUNTER — LAB (OUTPATIENT)
Dept: LAB | Facility: LAB | Age: 74
End: 2024-12-27
Payer: MEDICARE

## 2024-12-27 DIAGNOSIS — E78.01 FAMILIAL HYPERCHOLESTEROLEMIA: ICD-10-CM

## 2024-12-27 DIAGNOSIS — E78.5 HYPERLIPIDEMIA, UNSPECIFIED HYPERLIPIDEMIA TYPE: ICD-10-CM

## 2024-12-27 DIAGNOSIS — I10 ESSENTIAL (PRIMARY) HYPERTENSION: ICD-10-CM

## 2024-12-27 DIAGNOSIS — I10 HYPERTENSION, UNSPECIFIED TYPE: ICD-10-CM

## 2024-12-27 LAB
ALBUMIN SERPL BCP-MCNC: 4.5 G/DL (ref 3.4–5)
ALP SERPL-CCNC: 70 U/L (ref 33–136)
ALT SERPL W P-5'-P-CCNC: 13 U/L (ref 7–45)
ANION GAP SERPL CALC-SCNC: 20 MMOL/L (ref 10–20)
AST SERPL W P-5'-P-CCNC: 13 U/L (ref 9–39)
BASOPHILS # BLD AUTO: 0.05 X10*3/UL (ref 0–0.1)
BASOPHILS NFR BLD AUTO: 0.7 %
BILIRUB SERPL-MCNC: 0.8 MG/DL (ref 0–1.2)
BUN SERPL-MCNC: 18 MG/DL (ref 6–23)
CALCIUM SERPL-MCNC: 10.5 MG/DL (ref 8.6–10.6)
CHLORIDE SERPL-SCNC: 98 MMOL/L (ref 98–107)
CHOLEST SERPL-MCNC: 205 MG/DL (ref 0–199)
CHOLESTEROL/HDL RATIO: 2.9
CO2 SERPL-SCNC: 28 MMOL/L (ref 21–32)
CREAT SERPL-MCNC: 0.89 MG/DL (ref 0.5–1.05)
EGFRCR SERPLBLD CKD-EPI 2021: 67 ML/MIN/1.73M*2
EOSINOPHIL # BLD AUTO: 0.13 X10*3/UL (ref 0–0.4)
EOSINOPHIL NFR BLD AUTO: 1.8 %
ERYTHROCYTE [DISTWIDTH] IN BLOOD BY AUTOMATED COUNT: 11.9 % (ref 11.5–14.5)
GLUCOSE SERPL-MCNC: 96 MG/DL (ref 74–99)
HCT VFR BLD AUTO: 46.7 % (ref 36–46)
HDLC SERPL-MCNC: 71.2 MG/DL
HGB BLD-MCNC: 15.2 G/DL (ref 12–16)
IMM GRANULOCYTES # BLD AUTO: 0.02 X10*3/UL (ref 0–0.5)
IMM GRANULOCYTES NFR BLD AUTO: 0.3 % (ref 0–0.9)
LDLC SERPL CALC-MCNC: 86 MG/DL
LYMPHOCYTES # BLD AUTO: 1.31 X10*3/UL (ref 0.8–3)
LYMPHOCYTES NFR BLD AUTO: 18.4 %
MCH RBC QN AUTO: 31 PG (ref 26–34)
MCHC RBC AUTO-ENTMCNC: 32.5 G/DL (ref 32–36)
MCV RBC AUTO: 95 FL (ref 80–100)
MONOCYTES # BLD AUTO: 0.54 X10*3/UL (ref 0.05–0.8)
MONOCYTES NFR BLD AUTO: 7.6 %
NEUTROPHILS # BLD AUTO: 5.07 X10*3/UL (ref 1.6–5.5)
NEUTROPHILS NFR BLD AUTO: 71.2 %
NON HDL CHOLESTEROL: 134 MG/DL (ref 0–149)
NRBC BLD-RTO: 0 /100 WBCS (ref 0–0)
PLATELET # BLD AUTO: 346 X10*3/UL (ref 150–450)
POTASSIUM SERPL-SCNC: 3.7 MMOL/L (ref 3.5–5.3)
PROT SERPL-MCNC: 7.5 G/DL (ref 6.4–8.2)
RBC # BLD AUTO: 4.91 X10*6/UL (ref 4–5.2)
SODIUM SERPL-SCNC: 142 MMOL/L (ref 136–145)
TRIGL SERPL-MCNC: 240 MG/DL (ref 0–149)
VLDL: 48 MG/DL (ref 0–40)
WBC # BLD AUTO: 7.1 X10*3/UL (ref 4.4–11.3)

## 2024-12-27 PROCEDURE — 80061 LIPID PANEL: CPT

## 2024-12-27 PROCEDURE — 80053 COMPREHEN METABOLIC PANEL: CPT

## 2024-12-27 PROCEDURE — 85025 COMPLETE CBC W/AUTO DIFF WBC: CPT

## 2025-01-02 ENCOUNTER — APPOINTMENT (OUTPATIENT)
Dept: PRIMARY CARE | Facility: CLINIC | Age: 75
End: 2025-01-02
Payer: MEDICARE

## 2025-01-03 ENCOUNTER — APPOINTMENT (OUTPATIENT)
Dept: ORTHOPEDIC SURGERY | Facility: HOSPITAL | Age: 75
End: 2025-01-03
Payer: MEDICARE

## 2025-01-06 ENCOUNTER — APPOINTMENT (OUTPATIENT)
Dept: PRIMARY CARE | Facility: CLINIC | Age: 75
End: 2025-01-06
Payer: MEDICARE

## 2025-01-06 VITALS
OXYGEN SATURATION: 97 % | HEART RATE: 72 BPM | WEIGHT: 188 LBS | HEIGHT: 65 IN | SYSTOLIC BLOOD PRESSURE: 138 MMHG | DIASTOLIC BLOOD PRESSURE: 83 MMHG | TEMPERATURE: 96.7 F | BODY MASS INDEX: 31.32 KG/M2

## 2025-01-06 DIAGNOSIS — E78.5 HYPERLIPIDEMIA, UNSPECIFIED HYPERLIPIDEMIA TYPE: ICD-10-CM

## 2025-01-06 DIAGNOSIS — Z00.00 MEDICARE ANNUAL WELLNESS VISIT, SUBSEQUENT: Primary | ICD-10-CM

## 2025-01-06 DIAGNOSIS — F40.243 FEAR OF FLYING: ICD-10-CM

## 2025-01-06 DIAGNOSIS — I10 HYPERTENSION, UNSPECIFIED TYPE: ICD-10-CM

## 2025-01-06 DIAGNOSIS — E66.09 CLASS 1 OBESITY DUE TO EXCESS CALORIES WITHOUT SERIOUS COMORBIDITY WITH BODY MASS INDEX (BMI) OF 31.0 TO 31.9 IN ADULT: ICD-10-CM

## 2025-01-06 DIAGNOSIS — L30.4 INTERTRIGO: ICD-10-CM

## 2025-01-06 DIAGNOSIS — E66.811 CLASS 1 OBESITY DUE TO EXCESS CALORIES WITHOUT SERIOUS COMORBIDITY WITH BODY MASS INDEX (BMI) OF 31.0 TO 31.9 IN ADULT: ICD-10-CM

## 2025-01-06 DIAGNOSIS — I10 ESSENTIAL (PRIMARY) HYPERTENSION: ICD-10-CM

## 2025-01-06 DIAGNOSIS — G89.29 CHRONIC NONINTRACTABLE HEADACHE, UNSPECIFIED HEADACHE TYPE: ICD-10-CM

## 2025-01-06 DIAGNOSIS — E78.01 FAMILIAL HYPERCHOLESTEROLEMIA: ICD-10-CM

## 2025-01-06 DIAGNOSIS — R51.9 CHRONIC NONINTRACTABLE HEADACHE, UNSPECIFIED HEADACHE TYPE: ICD-10-CM

## 2025-01-06 DIAGNOSIS — J30.2 SEASONAL ALLERGIES: ICD-10-CM

## 2025-01-06 PROCEDURE — 1159F MED LIST DOCD IN RCRD: CPT | Performed by: STUDENT IN AN ORGANIZED HEALTH CARE EDUCATION/TRAINING PROGRAM

## 2025-01-06 PROCEDURE — G0439 PPPS, SUBSEQ VISIT: HCPCS | Performed by: STUDENT IN AN ORGANIZED HEALTH CARE EDUCATION/TRAINING PROGRAM

## 2025-01-06 PROCEDURE — 1123F ACP DISCUSS/DSCN MKR DOCD: CPT | Performed by: STUDENT IN AN ORGANIZED HEALTH CARE EDUCATION/TRAINING PROGRAM

## 2025-01-06 PROCEDURE — 3079F DIAST BP 80-89 MM HG: CPT | Performed by: STUDENT IN AN ORGANIZED HEALTH CARE EDUCATION/TRAINING PROGRAM

## 2025-01-06 PROCEDURE — 1160F RVW MEDS BY RX/DR IN RCRD: CPT | Performed by: STUDENT IN AN ORGANIZED HEALTH CARE EDUCATION/TRAINING PROGRAM

## 2025-01-06 PROCEDURE — 1158F ADVNC CARE PLAN TLK DOCD: CPT | Performed by: STUDENT IN AN ORGANIZED HEALTH CARE EDUCATION/TRAINING PROGRAM

## 2025-01-06 PROCEDURE — 1036F TOBACCO NON-USER: CPT | Performed by: STUDENT IN AN ORGANIZED HEALTH CARE EDUCATION/TRAINING PROGRAM

## 2025-01-06 PROCEDURE — 1170F FXNL STATUS ASSESSED: CPT | Performed by: STUDENT IN AN ORGANIZED HEALTH CARE EDUCATION/TRAINING PROGRAM

## 2025-01-06 PROCEDURE — 99214 OFFICE O/P EST MOD 30 MIN: CPT | Performed by: STUDENT IN AN ORGANIZED HEALTH CARE EDUCATION/TRAINING PROGRAM

## 2025-01-06 PROCEDURE — 3075F SYST BP GE 130 - 139MM HG: CPT | Performed by: STUDENT IN AN ORGANIZED HEALTH CARE EDUCATION/TRAINING PROGRAM

## 2025-01-06 RX ORDER — ATORVASTATIN CALCIUM 10 MG/1
10 TABLET, FILM COATED ORAL DAILY
Qty: 90 TABLET | Refills: 2 | Status: SHIPPED | OUTPATIENT
Start: 2025-01-06

## 2025-01-06 RX ORDER — IPRATROPIUM BROMIDE 42 UG/1
1 SPRAY, METERED NASAL 2 TIMES DAILY
Qty: 15 ML | Refills: 4 | Status: SHIPPED | OUTPATIENT
Start: 2025-01-06

## 2025-01-06 RX ORDER — ALPRAZOLAM 0.5 MG/1
0.5 TABLET ORAL DAILY PRN
Qty: 10 TABLET | Refills: 0 | Status: SHIPPED | OUTPATIENT
Start: 2025-01-06 | End: 2025-09-03

## 2025-01-06 RX ORDER — AMITRIPTYLINE HYDROCHLORIDE 10 MG/1
10 TABLET, FILM COATED ORAL NIGHTLY
Qty: 90 TABLET | Refills: 3 | Status: SHIPPED | OUTPATIENT
Start: 2025-01-06 | End: 2026-01-06

## 2025-01-06 RX ORDER — KETOCONAZOLE 20 MG/G
CREAM TOPICAL 2 TIMES DAILY
Qty: 60 G | Refills: 11 | Status: SHIPPED | OUTPATIENT
Start: 2025-01-06

## 2025-01-06 RX ORDER — TRIAMTERENE/HYDROCHLOROTHIAZID 37.5-25 MG
1 TABLET ORAL DAILY
Qty: 90 TABLET | Refills: 3 | Status: SHIPPED | OUTPATIENT
Start: 2025-01-06

## 2025-01-06 ASSESSMENT — ACTIVITIES OF DAILY LIVING (ADL)
GROCERY_SHOPPING: INDEPENDENT
BATHING: INDEPENDENT
DRESSING: INDEPENDENT
TAKING_MEDICATION: INDEPENDENT
MANAGING_FINANCES: INDEPENDENT
DOING_HOUSEWORK: INDEPENDENT

## 2025-01-06 ASSESSMENT — PATIENT HEALTH QUESTIONNAIRE - PHQ9
1. LITTLE INTEREST OR PLEASURE IN DOING THINGS: NOT AT ALL
2. FEELING DOWN, DEPRESSED OR HOPELESS: NOT AT ALL
SUM OF ALL RESPONSES TO PHQ9 QUESTIONS 1 AND 2: 0

## 2025-01-06 NOTE — PATIENT INSTRUCTIONS
Your blood work is up to date; no need for additional draw at this appointment    I have renewed your chronic medications today.     Your immunizations are up to date.    Follow up with your specialists as previously scheduled.

## 2025-01-06 NOTE — PROGRESS NOTES
"Subjective   Reason for Visit: Josep Chun is an 74 y.o. female here for a Medicare Wellness visit.     Past Medical, Surgical, and Family History reviewed and updated in chart.    Reviewed all medications by prescribing practitioner or clinical pharmacist (such as prescriptions, OTCs, herbal therapies and supplements) and documented in the medical record.    HPI  Doing only OK since last in with me. Having some interpersonal issues with her spouse.     Re: travel - upcoming flights to visit family. Requesting a small supply of Xanax to help her get through these flights.     Re: back pain - see pain mgmt notes. Gets injections on occasion.      Re: CV - has hx of HLD controlled on statin, and HTN controlled on current meds. Reports no sx high or low from HTN; denies blurry vision, HA, dizziness LoC CP SoB Good and leg swelling     Re: Rheum - see notes from [facility] rheumatologist. On bisphosphonate therapy.      Re: HM - shots current. Mamm and CRS current.      PMHx, FHx, Social Hx, Surg Hx personally reviewed at this appointment. No pertinent findings and/or changes from prior (if applicable).     ROS: Denies wt gain/loss f/c HA LoC CP SOB NVDC. See HPI above, and scanned sheet (if applicable). All other systems are reviewed and are without complaint.     Patient Care Team:  Tommy Schneider MD as PCP - General (Internal Medicine)     Review of Systems    Objective   Vitals:  /83   Pulse 72   Temp 35.9 °C (96.7 °F)   Ht 1.651 m (5' 5\")   Wt 85.3 kg (188 lb)   LMP  (LMP Unknown)   SpO2 97%   BMI 31.28 kg/m²       Physical Exam  Gen: well developed in NAD. AAO x3.  HEENT: NC/AT. Anicteric sclera, symmetric pupils. MMM no thrush.  Neck: Soft, supple. No LAD. No goiter.   CV: RRR nl s1s2 no m/r/g  Pulm: CTAB no w/r/r, good air exchange  GI: soft NTND BS+ no hsm  Ext: WWP no edema  Neuro: II-XII grossly intact, nonfocal systemic findings  MSK: 5/5 strength b/l UE and LE  Gait: unremarkable    Lab " Results   Component Value Date    WBC 7.1 12/27/2024    HGB 15.2 12/27/2024    HCT 46.7 (H) 12/27/2024     12/27/2024    CHOL 205 (H) 12/27/2024    TRIG 240 (H) 12/27/2024    HDL 71.2 12/27/2024    ALT 13 12/27/2024    AST 13 12/27/2024     12/27/2024    K 3.7 12/27/2024    CL 98 12/27/2024    CREATININE 0.89 12/27/2024    BUN 18 12/27/2024    CO2 28 12/27/2024    TSH 1.72 04/05/2021     par    XR hand 3+ views bilateral  Narrative: Interpreted By:  RUTH BRIAN MD  MRN: [id number]  Patient Name: VALENTINO SAAVEDRA     STUDY:  BILATERAL HAND; MIN 3 VIEWS;  5/5/2023 9:28 am     INDICATION:  bilateral hand pain  M19.042: Primary osteoarthritis of left hand  M19.041: Arthritis of hand, right, degenerative.     COMPARISON:  None.     ACCESSION NUMBER(S):  [id number]     ORDERING CLINICIAN:  ZELALEM COURTNEY     FINDINGS:  Three views each of the bilateral hands.     Left:     No acute fracture. No dislocation. The soft tissues are unremarkable.  No pronounced degenerative change.     Right:     No acute fracture. No dislocation. There 1st distal interphalangeal  degenerative changes. Mild 1st carpometacarpal degenerative change.  Mild 2nd and 3rd distal interphalangeal degenerative change. The soft  tissues are unremarkable.     Impression: Mild polyarticular right hand osteoarthrosis.     Relatively unremarkable radiographs left hand.      Current Outpatient Medications   Medication Instructions    ALPRAZolam (XANAX) 0.5 mg, oral, Daily PRN    amitriptyline (ELAVIL) 10 mg, oral, Nightly    atorvastatin (LIPITOR) 10 mg, oral, Daily    ipratropium (Atrovent) 42 mcg (0.06 %) nasal spray 1 spray, Each Nostril, 2 times daily    ketoconazole (NIZOral) 2 % cream Topical, 2 times daily    triamterene-hydrochlorothiazid (Maxzide-25) 37.5-25 mg tablet 1 tablet, oral, Daily          Assessment/Plan   Overall stable health since last in    # HTN: at/near goal  - continue current regimen  - routine lab work if not  recent  - continue lifestyle modifications     # hyperlipidemia: stable  - lipid panel, ASCVD+ score based on these values if age appropriate  - continue statin      # Anxiety: stable  - reasonable to renew 10 Xanax yearly  - OARRs run     # s/p breast surg  - follow up breast team     # Health Maintenance  - routine blood work  - Colon Cancer Screening: current  - Mammogram: current  - DEXA: on Reclast for osteopenia  - Immunizations: current    Problem List Items Addressed This Visit       Anxiety disorder    Relevant Medications    ALPRAZolam (Xanax) 0.5 mg tablet    Essential (primary) hypertension    Overview     Last Assessment & Plan: Formatting of this note might be different from the original. Assessment: /78 in [facility] office, on Maxzide, follows with PCP Last 3 Encounter BP Readings:    Date:        BP:    06/01/2023   132/78    03/10/2023   149/57    11/23/2022   124/75         Relevant Medications    triamterene-hydrochlorothiazid (Maxzide-25) 37.5-25 mg tablet    Familial hypercholesterolemia     Other Visit Diagnoses       Medicare annual wellness visit, subsequent    -  Primary    Chronic nonintractable headache, unspecified headache type        Relevant Medications    amitriptyline (Elavil) 10 mg tablet    Hyperlipidemia, unspecified hyperlipidemia type        Relevant Medications    atorvastatin (Lipitor) 10 mg tablet    Hypertension, unspecified type        Relevant Medications    triamterene-hydrochlorothiazid (Maxzide-25) 37.5-25 mg tablet    Seasonal allergies        Relevant Medications    ipratropium (Atrovent) 42 mcg (0.06 %) nasal spray    Intertrigo        Relevant Medications    ketoconazole (NIZOral) 2 % cream    Class 1 obesity due to excess calories without serious comorbidity with body mass index (BMI) of 31.0 to 31.9 in adult

## 2025-01-17 ENCOUNTER — APPOINTMENT (OUTPATIENT)
Dept: ORTHOPEDIC SURGERY | Facility: HOSPITAL | Age: 75
End: 2025-01-17
Payer: MEDICARE

## 2025-01-31 ENCOUNTER — OFFICE VISIT (OUTPATIENT)
Dept: ORTHOPEDIC SURGERY | Facility: HOSPITAL | Age: 75
End: 2025-01-31
Payer: MEDICARE

## 2025-01-31 DIAGNOSIS — M65.341 TRIGGER RING FINGER OF RIGHT HAND: Primary | ICD-10-CM

## 2025-01-31 PROCEDURE — 1160F RVW MEDS BY RX/DR IN RCRD: CPT | Performed by: ORTHOPAEDIC SURGERY

## 2025-01-31 PROCEDURE — 99213 OFFICE O/P EST LOW 20 MIN: CPT | Mod: 25 | Performed by: ORTHOPAEDIC SURGERY

## 2025-01-31 PROCEDURE — 1159F MED LIST DOCD IN RCRD: CPT | Performed by: ORTHOPAEDIC SURGERY

## 2025-01-31 PROCEDURE — 1036F TOBACCO NON-USER: CPT | Performed by: ORTHOPAEDIC SURGERY

## 2025-01-31 PROCEDURE — 2500000004 HC RX 250 GENERAL PHARMACY W/ HCPCS (ALT 636 FOR OP/ED): Performed by: ORTHOPAEDIC SURGERY

## 2025-01-31 PROCEDURE — 99203 OFFICE O/P NEW LOW 30 MIN: CPT | Performed by: ORTHOPAEDIC SURGERY

## 2025-01-31 PROCEDURE — 1123F ACP DISCUSS/DSCN MKR DOCD: CPT | Performed by: ORTHOPAEDIC SURGERY

## 2025-01-31 PROCEDURE — 20550 NJX 1 TENDON SHEATH/LIGAMENT: CPT | Mod: RT | Performed by: ORTHOPAEDIC SURGERY

## 2025-01-31 RX ADMIN — LIDOCAINE HYDROCHLORIDE 0.5 ML: 10 INJECTION, SOLUTION INFILTRATION; PERINEURAL at 08:21

## 2025-01-31 RX ADMIN — TRIAMCINOLONE ACETONIDE 20 MG: 40 INJECTION, SUSPENSION INTRA-ARTICULAR; INTRAMUSCULAR at 08:21

## 2025-01-31 NOTE — LETTER
February 5, 2025     Tommy Schneider MD  [address] [facility] [address]    Patient: Josep Chun   YOB: 1950   Date of Visit: 1/31/2025       Dear Dr. Tommy Schneider MD:    Thank you for referring Josep Chun to me for evaluation. Below are my notes for this consultation.  If you have questions, please do not hesitate to call me. I look forward to following your patient along with you.       Sincerely,     Sina Patel MD      CC: No Recipients  ______________________________________________________________________________________    CHIEF COMPLAINT         Right finger pain    ASSESSMENT + PLAN    Right ring trigger finger    The nature of trigger finger was reviewed, along with the natural history.  I reviewed the options for management, including observation, nonsteroidals, splinting, oral steroids, cortisone injection, or surgical release, along with the likely success rates of each.  I reviewed the risks of cortisone injection, including infection, hypopigmentation, and fat atrophy.  I cautioned the patient to avoid hot objects where the finger could be burned, if it becomes insensate temporarily from the lidocaine. The transient cortisone effect on blood glucose measurement was also reviewed, and the patient wished to proceed.    After sterile prep, I injected 20 mg of Kenalog and 0.5 cc of 1% lidocaine, plain to the flexor sheath of the right ring finger.    Patient will followup in 4 weeks if still symptomatic, or with any concerns.        HISTORY OF PRESENT ILLNESS       Patient is a 74 y.o. right-hand dominant female, who presents today for evaluation of pain, popping, and catching of the right ring finger.  This has been going off and on for several years.  No recalled trauma around time of onset or worsening.  No numbness or tingling.  No other trigger digits.  Her right long mucous cyst has not been bothersome since her visit with me in 2020.  She has had an  injection from Dr. Garrett for the trigger finger with good, though temporary improvement.  She is here requesting another injection.    She is not diabetic or hypothyroid.  She does not smoke.      REVIEW OF SYSTEMS       An updated 30-item multi-system Review Of Systems was obtained on today's intake form.  This was reviewed with the patient and is correct.  It has been scanned separately into the medical record.      PHYSICAL EXAM    Constitutional:    Appears stated age. Well-developed and well-nourished obese female in no acute distress.  Psychiatric:         Pleasant normal mood and affect. Behavior is appropriate for the situation.   Head:                   Normocephalic and atraumatic.  Eyes:                    Pupils are equal and round.  Cardiovascular:  2+ radial and ulnar pulses. Fingers well-perfused.  Respiratory:        Effort normal. No respiratory distress. Speaking in complete sentences.  Neurologic:       Alert and oriented to person, place, and time.  Skin:                Skin is intact, warm and dry.  Hematologic / Lymphatic:    No lymphedema or lymphangitis.    Extremities / Musculoskeletal:                      Skin of right ring finger and hand is intact with no erythema, ecchymosis, or diffuse swelling.  No cortisone stigmata.  Palpable flexor nodule and A1 tenderness only of right ring.  Full composite flexion extension but obvious spontaneous triggering.  Intact intrinsic plus minus posture.  Normal rotation with no scissoring.  Good sagittal plane balance.  Normal resting cascade.  Symmetric wrist and forearm motion.  Negative Gunderson.  Negative midcarpal shift.  Sensation intact to light touch in all distributions.  Capillary refill less than 2 seconds.      IMAGING / LABS / EMGs           None pertinent      Past Medical History:   Diagnosis Date   • Breast density 01/02/2024   • Depression 01/02/2024   • Hip osteoarthritis 01/02/2024   • Hypothyroidism 01/02/2024   • Insomnia  01/02/2024   • Meniere's disease, unspecified ear     Meniere disease   • Pain in unspecified hip 08/19/2013    Joint pain, hip   • Personal history of other diseases of the musculoskeletal system and connective tissue 07/30/2013    History of bursitis   • Personal history of other endocrine, nutritional and metabolic disease     History of thyroid disease   • Personal history of other infectious and parasitic diseases     History of varicella   • Primary osteoarthritis of left hand 01/02/2024       Medication Documentation Review Audit       Reviewed by Tommy Schneider MD (Physician) on 01/06/25 at 1433      Medication Order Taking? Sig Documenting Provider Last Dose Status   ALPRAZolam (Xanax) 0.5 mg tablet [id number]  Take 1 tablet (0.5 mg) by mouth once daily as needed for anxiety (flying). Tommy Schneider MD  Active     Discontinued 01/06/25 1429   amitriptyline (Elavil) 10 mg tablet [id number]  Take 1 tablet (10 mg) by mouth once daily at bedtime. Tommy Schneider MD  Active     Discontinued 01/06/25 1429   atorvastatin (Lipitor) 10 mg tablet [id number]  Take 1 tablet (10 mg) by mouth once daily. Tommy Schneider MD  Active     Discontinued 01/06/25 1429   ipratropium (Atrovent) 42 mcg (0.06 %) nasal spray [id number]  Administer 1 spray into each nostril 2 times a day. Tommy Schneider MD  Active     Discontinued 01/06/25 1429   ketoconazole (NIZOral) 2 % cream [id number]  Apply topically 2 times a day. Tommy Schneider MD  Active   methylPREDNISolone acetate (DEPO-Medrol) injection 40 mg [id number]   Nancy Garrett MD  Active     Discontinued 01/06/25 1429   triamterene-hydrochlorothiazid (Maxzide-25) 37.5-25 mg tablet [id number]  Take 1 tablet by mouth once daily. Tommy Schneider MD  Active                    Allergies   Allergen Reactions   • Crab Anaphylaxis   • Shellfish Derived Anaphylaxis   • Iodine Unknown     iodine    Instructed to avoid due to reaction to seafood crab   • Amlodipine Swelling   •  Iodinated Contrast Media Unknown       Social History     Socioeconomic History   • Marital status:      Spouse name: Not on file   • Number of children: Not on file   • Years of education: Not on file   • Highest education level: Not on file   Occupational History   • Not on file   Tobacco Use   • Smoking status: Former     Current packs/day: 0.00     Average packs/day: 1.5 packs/day for 10.0 years (15.0 ttl pk-yrs)     Types: Cigarettes     Start date: 1969     Quit date: 1979     Years since quittin.2   • Smokeless tobacco: Never   Vaping Use   • Vaping status: Never Used   Substance and Sexual Activity   • Alcohol use: Never   • Drug use: Never   • Sexual activity: Not on file   Other Topics Concern   • Not on file   Social History Narrative   • Not on file     Social Drivers of Health     Financial Resource Strain: Not on File (3/17/2024)    Received from [facility]    Financial Resource Strain    • Financial Resource Strain: 0   Food Insecurity: Not on File (3/17/2024)    Received from [facility]    Food Insecurity    • Food: 0   Transportation Needs: Not on File (3/17/2024)    Received from [facility]    Transportation Needs    • Transportation: 0   Physical Activity: Not on File (3/17/2024)    Received from [facility]    Physical Activity    • Physical Activity: 0   Stress: Not on File (3/17/2024)    Received from [facility]    Stress    • Stress: 0   Social Connections: Not on File (3/17/2024)    Received from [facility]    Social Connections    • Social Connections and Isolation: 0   Intimate Partner Violence: Not on file   Housing Stability: Not on File (3/17/2024)    Received from [facility]    Housing Stability    • Housin       Past Surgical History:   Procedure Laterality Date   •  SECTION, CLASSIC  2014     Section   • COLONOSCOPY  2014    Complete Colonoscopy   • OTHER SURGICAL HISTORY  2020    Shoulder arthroscopy   • TUBAL  LIGATION  03/19/2014    Tubal Ligation       PROCEDURE    Hand / UE Inj/Asp: R ring A1 for trigger finger on 1/31/2025 8:21 AM  Indications: therapeutic  Details: 25 G needle, volar approach  Medications: 20 mg triamcinolone acetonide 40 mg/mL; 0.5 mL lidocaine 10 mg/mL (1 %)  Outcome: tolerated well, no immediate complications  Procedure, treatment alternatives, risks and benefits explained, specific risks discussed. Consent was given by the patient.           Electronically Signed      TAMY Patel MD      Orthopaedic Hand Surgery      [phone]

## 2025-01-31 NOTE — PROGRESS NOTES
CHIEF COMPLAINT         Right finger pain    ASSESSMENT + PLAN    Right ring trigger finger    The nature of trigger finger was reviewed, along with the natural history.  I reviewed the options for management, including observation, nonsteroidals, splinting, oral steroids, cortisone injection, or surgical release, along with the likely success rates of each.  I reviewed the risks of cortisone injection, including infection, hypopigmentation, and fat atrophy.  I cautioned the patient to avoid hot objects where the finger could be burned, if it becomes insensate temporarily from the lidocaine. The transient cortisone effect on blood glucose measurement was also reviewed, and the patient wished to proceed.    After sterile prep, I injected 20 mg of Kenalog and 0.5 cc of 1% lidocaine, plain to the flexor sheath of the right ring finger.    Patient will followup in 4 weeks if still symptomatic, or with any concerns.        HISTORY OF PRESENT ILLNESS       Patient is a 74 y.o. right-hand dominant female, who presents today for evaluation of pain, popping, and catching of the right ring finger.  This has been going off and on for several years.  No recalled trauma around time of onset or worsening.  No numbness or tingling.  No other trigger digits.  Her right long mucous cyst has not been bothersome since her visit with me in 2020.  She has had an injection from Dr. Garrett for the trigger finger with good, though temporary improvement.  She is here requesting another injection.    She is not diabetic or hypothyroid.  She does not smoke.      REVIEW OF SYSTEMS       An updated 30-item multi-system Review Of Systems was obtained on today's intake form.  This was reviewed with the patient and is correct.  It has been scanned separately into the medical record.      PHYSICAL EXAM    Constitutional:    Appears stated age. Well-developed and well-nourished obese female in no acute distress.  Psychiatric:         Pleasant  normal mood and affect. Behavior is appropriate for the situation.   Head:                   Normocephalic and atraumatic.  Eyes:                    Pupils are equal and round.  Cardiovascular:  2+ radial and ulnar pulses. Fingers well-perfused.  Respiratory:        Effort normal. No respiratory distress. Speaking in complete sentences.  Neurologic:       Alert and oriented to person, place, and time.  Skin:                Skin is intact, warm and dry.  Hematologic / Lymphatic:    No lymphedema or lymphangitis.    Extremities / Musculoskeletal:                      Skin of right ring finger and hand is intact with no erythema, ecchymosis, or diffuse swelling.  No cortisone stigmata.  Palpable flexor nodule and A1 tenderness only of right ring.  Full composite flexion extension but obvious spontaneous triggering.  Intact intrinsic plus minus posture.  Normal rotation with no scissoring.  Good sagittal plane balance.  Normal resting cascade.  Symmetric wrist and forearm motion.  Negative Gunderson.  Negative midcarpal shift.  Sensation intact to light touch in all distributions.  Capillary refill less than 2 seconds.      IMAGING / LABS / EMGs           None pertinent      Past Medical History:   Diagnosis Date    Breast density 01/02/2024    Depression 01/02/2024    Hip osteoarthritis 01/02/2024    Hypothyroidism 01/02/2024    Insomnia 01/02/2024    Meniere's disease, unspecified ear     Meniere disease    Pain in unspecified hip 08/19/2013    Joint pain, hip    Personal history of other diseases of the musculoskeletal system and connective tissue 07/30/2013    History of bursitis    Personal history of other endocrine, nutritional and metabolic disease     History of thyroid disease    Personal history of other infectious and parasitic diseases     History of varicella    Primary osteoarthritis of left hand 01/02/2024       Medication Documentation Review Audit       Reviewed by Tommy Schneider MD (Physician) on  25 at 1433      Medication Order Taking? Sig Documenting Provider Last Dose Status   ALPRAZolam (Xanax) 0.5 mg tablet [id number]  Take 1 tablet (0.5 mg) by mouth once daily as needed for anxiety (flying). Tommy Schneider MD  Active     Discontinued 25 1429   amitriptyline (Elavil) 10 mg tablet [id number]  Take 1 tablet (10 mg) by mouth once daily at bedtime. Tommy Scnheider MD  Active     Discontinued 25 1429   atorvastatin (Lipitor) 10 mg tablet [id number]  Take 1 tablet (10 mg) by mouth once daily. Tommy Schneider MD  Active     Discontinued 25 142   ipratropium (Atrovent) 42 mcg (0.06 %) nasal spray [id number]  Administer 1 spray into each nostril 2 times a day. Tommy Schneider MD  Active     Discontinued 25 1429   ketoconazole (NIZOral) 2 % cream [id number]  Apply topically 2 times a day. Tommy Schneider MD  Active   methylPREDNISolone acetate (DEPO-Medrol) injection 40 mg [id number]   Nancy Garrett MD  Active     Discontinued 25 1429   triamterene-hydrochlorothiazid (Maxzide-25) 37.5-25 mg tablet [id number]  Take 1 tablet by mouth once daily. Tommy Schneider MD  Active                    Allergies   Allergen Reactions    Crab Anaphylaxis    Shellfish Derived Anaphylaxis    Iodine Unknown     iodine    Instructed to avoid due to reaction to seafood crab    Amlodipine Swelling    Iodinated Contrast Media Unknown       Social History     Socioeconomic History    Marital status:      Spouse name: Not on file    Number of children: Not on file    Years of education: Not on file    Highest education level: Not on file   Occupational History    Not on file   Tobacco Use    Smoking status: Former     Current packs/day: 0.00     Average packs/day: 1.5 packs/day for 10.0 years (15.0 ttl pk-yrs)     Types: Cigarettes     Start date: 1969     Quit date: 1979     Years since quittin.2    Smokeless tobacco: Never   Vaping Use    Vaping status: Never Used    Substance and Sexual Activity    Alcohol use: Never    Drug use: Never    Sexual activity: Not on file   Other Topics Concern    Not on file   Social History Narrative    Not on file     Social Drivers of Health     Financial Resource Strain: Not on File (3/17/2024)    Received from [facility]    Financial Resource Strain     Financial Resource Strain: 0   Food Insecurity: Not on File (3/17/2024)    Received from [facility]    Food Insecurity     Food: 0   Transportation Needs: Not on File (3/17/2024)    Received from [facility]    Transportation Needs     Transportation: 0   Physical Activity: Not on File (3/17/2024)    Received from [facility]    Physical Activity     Physical Activity: 0   Stress: Not on File (3/17/2024)    Received from [facility]    Stress     Stress: 0   Social Connections: Not on File (3/17/2024)    Received from [facility]    Social Connections     Social Connections and Isolation: 0   Intimate Partner Violence: Not on file   Housing Stability: Not on File (3/17/2024)    Received from [facility]    Housing Stability     Housin       Past Surgical History:   Procedure Laterality Date     SECTION, CLASSIC  2014     Section    COLONOSCOPY  2014    Complete Colonoscopy    OTHER SURGICAL HISTORY  2020    Shoulder arthroscopy    TUBAL LIGATION  2014    Tubal Ligation       PROCEDURE    Hand / UE Inj/Asp: R ring A1 for trigger finger on 2025 8:21 AM  Indications: therapeutic  Details: 25 G needle, volar approach  Medications: 20 mg triamcinolone acetonide 40 mg/mL; 0.5 mL lidocaine 10 mg/mL (1 %)  Outcome: tolerated well, no immediate complications  Procedure, treatment alternatives, risks and benefits explained, specific risks discussed. Consent was given by the patient.           Electronically Signed      TAMY Patel MD      Orthopaedic Hand Surgery      [phone]

## 2025-02-05 PROBLEM — M65.341 TRIGGER RING FINGER OF RIGHT HAND: Status: ACTIVE | Noted: 2025-02-05

## 2025-02-05 RX ORDER — LIDOCAINE HYDROCHLORIDE 10 MG/ML
0.5 INJECTION, SOLUTION INFILTRATION; PERINEURAL
Status: COMPLETED | OUTPATIENT
Start: 2025-01-31 | End: 2025-01-31

## 2025-02-05 RX ORDER — TRIAMCINOLONE ACETONIDE 40 MG/ML
20 INJECTION, SUSPENSION INTRA-ARTICULAR; INTRAMUSCULAR
Status: COMPLETED | OUTPATIENT
Start: 2025-01-31 | End: 2025-01-31

## 2025-04-23 ENCOUNTER — APPOINTMENT (OUTPATIENT)
Dept: DERMATOLOGY | Facility: CLINIC | Age: 75
End: 2025-04-23
Payer: MEDICARE

## 2025-07-11 DIAGNOSIS — E78.5 HYPERLIPIDEMIA, UNSPECIFIED HYPERLIPIDEMIA TYPE: ICD-10-CM

## 2025-07-11 RX ORDER — ATORVASTATIN CALCIUM 10 MG/1
10 TABLET, FILM COATED ORAL DAILY
Qty: 90 TABLET | Refills: 2 | Status: SHIPPED | OUTPATIENT
Start: 2025-07-11

## 2025-08-21 ENCOUNTER — OFFICE VISIT (OUTPATIENT)
Dept: PAIN MEDICINE | Facility: HOSPITAL | Age: 75
End: 2025-08-21
Payer: MEDICARE

## 2025-08-21 DIAGNOSIS — M54.12 CERVICAL RADICULOPATHY: ICD-10-CM

## 2025-08-21 PROCEDURE — 99212 OFFICE O/P EST SF 10 MIN: CPT

## 2025-08-21 PROCEDURE — 1159F MED LIST DOCD IN RCRD: CPT | Performed by: ANESTHESIOLOGY

## 2025-08-21 PROCEDURE — 99214 OFFICE O/P EST MOD 30 MIN: CPT | Performed by: ANESTHESIOLOGY

## 2025-09-05 ENCOUNTER — HOSPITAL ENCOUNTER (OUTPATIENT)
Facility: HOSPITAL | Age: 75
Discharge: HOME | End: 2025-09-05
Payer: MEDICARE

## 2025-09-05 VITALS
SYSTOLIC BLOOD PRESSURE: 150 MMHG | RESPIRATION RATE: 21 BRPM | HEART RATE: 77 BPM | TEMPERATURE: 98.2 F | BODY MASS INDEX: 31.76 KG/M2 | HEIGHT: 64 IN | DIASTOLIC BLOOD PRESSURE: 79 MMHG | WEIGHT: 186 LBS | OXYGEN SATURATION: 98 %

## 2025-09-05 DIAGNOSIS — M54.12 CERVICAL RADICULOPATHY: ICD-10-CM

## 2025-09-05 PROCEDURE — 2500000004 HC RX 250 GENERAL PHARMACY W/ HCPCS (ALT 636 FOR OP/ED): Performed by: ANESTHESIOLOGY

## 2025-09-05 PROCEDURE — 2550000001 HC RX 255 CONTRASTS: Mod: JW | Performed by: ANESTHESIOLOGY

## 2025-09-05 PROCEDURE — 62321 NJX INTERLAMINAR CRV/THRC: CPT | Performed by: ANESTHESIOLOGY

## 2025-09-05 PROCEDURE — 2500000005 HC RX 250 GENERAL PHARMACY W/O HCPCS: Performed by: ANESTHESIOLOGY

## 2025-09-05 PROCEDURE — 7100000010 HC PHASE TWO TIME - EACH INCREMENTAL 1 MINUTE

## 2025-09-05 PROCEDURE — 7100000009 HC PHASE TWO TIME - INITIAL BASE CHARGE

## 2025-09-05 RX ORDER — DEXAMETHASONE SODIUM PHOSPHATE 10 MG/ML
INJECTION INTRAMUSCULAR; INTRAVENOUS
Status: COMPLETED | OUTPATIENT
Start: 2025-09-05 | End: 2025-09-05

## 2025-09-05 RX ORDER — SODIUM CHLORIDE 9 MG/ML
INJECTION, SOLUTION INTRAMUSCULAR; INTRAVENOUS; SUBCUTANEOUS
Status: COMPLETED | OUTPATIENT
Start: 2025-09-05 | End: 2025-09-05

## 2025-09-05 RX ORDER — LIDOCAINE HYDROCHLORIDE 5 MG/ML
INJECTION, SOLUTION INFILTRATION; INTRAVENOUS
Status: COMPLETED | OUTPATIENT
Start: 2025-09-05 | End: 2025-09-05

## 2025-09-05 RX ADMIN — LIDOCAINE HYDROCHLORIDE 7 ML: 5 INJECTION, SOLUTION INFILTRATION at 14:57

## 2025-09-05 RX ADMIN — DEXAMETHASONE SODIUM PHOSPHATE 10 MG: 10 INJECTION, SOLUTION INTRAMUSCULAR; INTRAVENOUS at 14:58

## 2025-09-05 RX ADMIN — IOHEXOL 2 ML: 240 INJECTION, SOLUTION INTRATHECAL; INTRAVASCULAR; INTRAVENOUS; ORAL at 14:57

## 2025-09-05 RX ADMIN — SODIUM CHLORIDE 2 ML: 9 INJECTION INTRAMUSCULAR; INTRAVENOUS; SUBCUTANEOUS at 14:58

## 2025-09-05 ASSESSMENT — PAIN - FUNCTIONAL ASSESSMENT
PAIN_FUNCTIONAL_ASSESSMENT: 0-10
PAIN_FUNCTIONAL_ASSESSMENT: 0-10
PAIN_FUNCTIONAL_ASSESSMENT: WONG-BAKER FACES
PAIN_FUNCTIONAL_ASSESSMENT: WONG-BAKER FACES

## 2025-09-05 ASSESSMENT — PAIN SCALES - GENERAL
PAINLEVEL_OUTOF10: 7
PAINLEVEL_OUTOF10: 6